# Patient Record
Sex: FEMALE | Race: WHITE | Employment: UNEMPLOYED | URBAN - METROPOLITAN AREA
[De-identification: names, ages, dates, MRNs, and addresses within clinical notes are randomized per-mention and may not be internally consistent; named-entity substitution may affect disease eponyms.]

---

## 2018-08-06 ENCOUNTER — DOCUMENTATION ONLY (OUTPATIENT)
Dept: NEUROLOGY | Age: 53
End: 2018-08-06

## 2018-08-08 ENCOUNTER — DOCUMENTATION ONLY (OUTPATIENT)
Dept: NEUROLOGY | Age: 53
End: 2018-08-08

## 2018-08-13 ENCOUNTER — DOCUMENTATION ONLY (OUTPATIENT)
Dept: NEUROLOGY | Age: 53
End: 2018-08-13

## 2018-08-13 ENCOUNTER — OFFICE VISIT (OUTPATIENT)
Dept: NEUROLOGY | Age: 53
End: 2018-08-13

## 2018-08-13 VITALS
SYSTOLIC BLOOD PRESSURE: 138 MMHG | OXYGEN SATURATION: 98 % | WEIGHT: 145.4 LBS | DIASTOLIC BLOOD PRESSURE: 60 MMHG | BODY MASS INDEX: 24.22 KG/M2 | HEIGHT: 65 IN | HEART RATE: 91 BPM | RESPIRATION RATE: 16 BRPM

## 2018-08-13 DIAGNOSIS — Z72.820 POOR SLEEP: ICD-10-CM

## 2018-08-13 DIAGNOSIS — R41.3 MEMORY LOSS: Primary | ICD-10-CM

## 2018-08-13 DIAGNOSIS — Z82.0 FAMILY HISTORY OF SLEEP APNEA: ICD-10-CM

## 2018-08-13 DIAGNOSIS — Z81.8 FAMILY HISTORY OF DEMENTIA: ICD-10-CM

## 2018-08-13 RX ORDER — ASPIRIN 81 MG/1
TABLET ORAL DAILY
COMMUNITY

## 2018-08-13 RX ORDER — LANOLIN ALCOHOL/MO/W.PET/CERES
500 CREAM (GRAM) TOPICAL DAILY
COMMUNITY

## 2018-08-13 RX ORDER — ATORVASTATIN CALCIUM 10 MG/1
TABLET, FILM COATED ORAL DAILY
COMMUNITY

## 2018-08-13 NOTE — PROGRESS NOTES
Neurology Consult Note      HISTORY PROVIDED BY: patient    Chief Complaint:   Chief Complaint   Patient presents with    Memory Loss      Subjective:    Radha Bland is a 47 y.o. right handed female who presents in consultation for memory loss. Pt reports short term memory loss for the last 3 years. Gives example of being unable to recall names, may lose basket in store, care in parking lot. She is taking notes for phone conversations while working. Her family has noticed changes as well. Started on atorvastatin and ASA 81mg daily for a couple of years due to family h/o vascular disease and MRI findings from 2016. She had a low B12, 245, and just started Vit B12 replacement, 500mcg daily. Doesn't sleep well, wakes up to 10 times a night, may take an hour or two to fall back to sleep one of those times. No environmental cause for waking, no snoring. No trouble going to sleep. Family h/o JORDI in her father. MRI brain wo contrast 16 reviewed in PACS with patient-minimal T2/flair hyperintensities in subcortical white matter, nonspecific, could be consistent with chronic ischemic white matter changes. Labs 18 hemoglobin A1c 5.7, B12 245, CMP-glucose 105, TSH 1.81, CBC-unremarkable, UA-unremarkable, LDL 76-was 105 on 17, vitamin D level 17 was 49.7.     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      Social History Main Topics    Smoking status: Never Smoker    Smokeless tobacco: Never Used    Alcohol use 8.4 oz/week     14 Glasses of wine per week      Comment: 2-3 wine a day    Drug use: No    Sexual activity: Not on file     Other Topics Concern    Not on file     Social History Narrative    , lives in [place]     Family History   Problem Relation Age of Onset    Headache Mother      Dec 69AM from complications of MMP    Heart Disease Mother     Diabetes Mother     Diabetes Father     Dementia Maternal Grandmother     Heart Disease Maternal Grandmother     Stroke Maternal Grandfather     Diabetes Sister     Heart Disease Sister      Has PM    No Known Problems Brother     Dementia Paternal Grandmother     No Known Problems Daughter     No Known Problems Son          Objective:   Review of Systems   Constitutional: Negative. HENT: Negative. Respiratory: Negative. Cardiovascular: Negative. Gastrointestinal: Positive for constipation. Genitourinary: Negative. Musculoskeletal: Negative. Skin: Negative. Neurological: Negative. Endo/Heme/Allergies: Negative. Psychiatric/Behavioral: Positive for memory loss. Not on File     Meds:    Current Outpatient Prescriptions:     atorvastatin (LIPITOR) 10 mg tablet, Take  by mouth daily. , Disp: , Rfl:     aspirin delayed-release 81 mg tablet, Take  by mouth daily. , Disp: , Rfl:       Imaging:  MRI Results (most recent):    Results from [place] encounter on 08/05/16   MRI BRAIN WO CONT   Narrative **Final Report**      ICD Codes / Adm. Diagnosis: 780.93  R41.3 / Memory loss  Other amnesia  Examination:  MR BRAIN WO CON  - [id number] - Aug  5 2016 11:57AM  Accession No:  [id number]  Reason:  Memory loss      REPORT:  Study: MR BRAIN WITHOUT CONTRAST    Indication:  Memory loss    Additional clinical history:  Memory loss Other amnesia    Comparison: None available. Contrast:  None administered. Technique: The following sequences were obtained: Axial T1, T2, T2 FLAIR,   gradient echo; sagittal T1;   Coronal T2; diffusion-weighted imaging. Findings:  The ventricles and sulci are normal in appearance. There is no mass effect   or midline shift. There is no intracranial hemorrhage. Scattered T2/T2 FLAIR   hyperintense foci are seen in subcortical white matter, within normal limits   for the patient's age. Diffusion-weighted imaging demonstrates no evidence of diffusion restriction. There are normal appearing vascular flow voids. The orbits are intact. Normal signal is seen in the paranasal sinuses and   mastoid air cells. IMPRESSION:  No acute intracranial abnormality. No apparent volume loss. Minimal small   vessel ischemic disease changes. Signing/Reading Doctor: Adrianne Davis ([id number])    Gino Davis ([id number])  Aug  5 2016 12:15PM                              CT Results (most recent):  No results found for this or any previous visit. Reviewed records in [facility] [facility] [facility] tab today    Lab Review   No results found for this or any previous visit. Exam:  Visit Vitals    /60    Pulse 91    Resp 16    Ht 5' 5\" (1.651 m)    Wt 66 kg (145 lb 6.4 oz)    SpO2 98%    BMI 24.2 kg/m2     General:  Alert, cooperative, no distress. Head:  Normocephalic, without obvious abnormality, atraumatic. Respiratory:  Heart:   Non labored breathing  Regular rate and rhythm, no murmurs   Neck:   2+ carotids, no bruits   Extremities: Warm, no cyanosis or edema. Pulses: 2+ radial pulses. Neurologic:  MS: Alert and oriented x 4, speech intact. Language intact. Attention and fund of knowledge appropriate. Recent and remote memory intact.   Cranial Nerves:  II: visual fields Full to confrontation   II: pupils Equal, round, reactive to light   II: optic disc    III,VII: ptosis none   III,IV,VI: extraocular muscles  EOMI, no nystagmus or diplopia   V: facial light touch sensation  normal   VII: facial muscle function   symmetric   VIII: hearing intact   IX: soft palate elevation  normal   XI: trapezius strength  5/5   XI: sternocleidomastoid strength 5/5   XII: tongue  Midline     Motor: normal bulk and tone, no tremor Strength: 5/5 throughout, no PD  Sensory: intact to LT, PP  Coordination: FTN and HTS intact, MARIAM intact  Gait: normal gait, able to tandem walk  Reflexes: 2+ symmetric, toes mute    Mini Mental State Exam 2018   What is the Year 1   What is the Season 1   What is the Date 1   What is the Day 1   What is the Month 1   Where are we State 1   Where are we Country 1   Where are we [place] [facility] 1   [place] are we Floor 1   Name three objects, then ask the patient to say them 3   Serial sevens Subtract 7 from 100 in increments 5   Ask for the three objects repeated above 2   Name a pencil 1   Name a watch 1   Have the patient repeat this phrase \"No ifs, ands, or buts\" 1   Three stage command: Take the paper in your right hand 1   Fold the paper in half 1   Put the paper on the floor 1   Read and obey the followin [place] 1   Have the patient write a sentence 1   Have the patient copy a figure 1   Mini Mental Score 29          Assessment/Plan   Pt is a 47 y.o. right handed female c/o short term memory loss for the last 3 years, has strong family h/o dementia in MGM and PGM. PCP ordered MRI brain wo contrast in Aug, 2016 with minimal CIWM changes, on atorvastatin and ASA 81mg daily. B12, 245, just started Vit B12 replacement 500mcg daily. Pt reports poor sleep, waking frequently through the night for no obvious reason, has family h/o JORDI in father. Exam with MMSE score 29/30 missing one at delayed recall able to recall with prompting, o/w is non-focal and unremarkable. Patient's memory loss could be age-related, on background of poor sleep, and vit B12 deficiency could also be playing a part, but given strong family history of dementia cannot exclude early or mild dementia. Recommend neuropsychological testing to tease out dementia versus a pseudodementia. Recommend sleep evaluation for possible obstructive sleep apnea.   Follow-up made in clinic for 6 months to give her time to have these test completed and that is a good interval to reassess her memory. She is instructed to call in the interim should she have any drastic change or new  neurological symptoms develop. ICD-10-CM ICD-9-CM    1. Memory loss R41.3 780.93 REFERRAL TO NEUROPSYCHOLOGY      REFERRAL TO SLEEP STUDIES   2. Family history of dementia Z81.8 V17.2 REFERRAL TO NEUROPSYCHOLOGY   3. Poor sleep Z72.820 V69.4 REFERRAL TO NEUROPSYCHOLOGY      REFERRAL TO SLEEP STUDIES   4. Family history of sleep apnea Z82.0 V19.8 REFERRAL TO SLEEP STUDIES       Signed:   Victoriano Hensley MD  8/13/2018

## 2018-08-13 NOTE — MR AVS SNAPSHOT
[facility] [address] [place] [address] 
[phone] Patient: Ilya Gotti MRN: [id number] Visit Information Date & Time Provider Department Dept. Phone Encounter #  
 8/13/2018 11:00 AM Aaliyah Pittman MD [facility] at [address] [id number] Follow-up Instructions Return in about 6 months (around 2/13/2019). Upcoming Health Maintenance Date Due Hepatitis C Screening 1965 DTaP/Tdap/Td series (1 - Tdap) 9/14/1985 PAP AKA CERVICAL CYTOLOGY 9/14/1985 BREAST CANCER SCRN MAMMOGRAM 9/14/2014 FOBT Q 1 YEAR AGE 50-75 9/14/2014 Influenza Age 5 to Adult 8/1/2018 Allergies as of 8/13/2018  Review Complete On: 8/13/2018 By: Aaliyah Pittman MD  
 Not on File Current Immunizations  Never Reviewed No immunizations on file. Not reviewed this visit You Were Diagnosed With   
  
 Codes Comments Memory loss    -  Primary ICD-10-CM: R41.3 ICD-9-CM: 780.93 Family history of dementia     ICD-10-CM: Z81.8 ICD-9-CM: V17.2 Poor sleep     ICD-10-CM: Z72.820 ICD-9-CM: V69.4 Family history of sleep apnea     ICD-10-CM: Z82.0 ICD-9-CM: V19.8 Vitals BP Pulse Resp Height(growth percentile) Weight(growth percentile) SpO2  
 138/60 91 16 5' 5\" (1.651 m) 145 lb 6.4 oz (66 kg) 98% BMI Smoking Status 24.2 kg/m2 Never Smoker Vitals History BMI and BSA Data Body Mass Index Body Surface Area  
 24.2 kg/m 2 1.74 m 2 Your Updated Medication List  
  
   
This list is accurate as of 8/13/18 11:36 AM.  Always use your most recent med list.  
  
  
  
  
 aspirin delayed-release 81 mg tablet Take  by mouth daily. atorvastatin 10 mg tablet Commonly known as:  LIPITOR Take  by mouth daily. VITAMIN B-12 500 mcg tablet Generic drug:  cyanocobalamin Take 500 mcg by mouth daily. We Performed the Following REFERRAL TO NEUROPSYCHOLOGY [INX00 Custom] Comments:  
 Please evaluate patient for dementia vs pseudodementia. Family h/o dementia in MGM and PGM. REFERRAL TO SLEEP STUDIES [REF99 Custom] Comments:  
 Please evaluate patient for JORDI. Pt wakes throughout the night, denies worrying. Has family h/o JORDI in father. Follow-up Instructions Return in about 6 months (around 2/13/2019). Referral Information Referral ID Referred By Referred To  
  
 [id number] Danilo Barros [address] [facility], [address] [facility] Phone: [phone] Fax: [phone] Visits Status Start Date End Date 1 New Request 8/13/18 8/13/19 If your referral has a status of pending review or denied, additional information will be sent to support the outcome of this decision. Referral ID Referred By Referred To  
 [id number] Melquiades Alvarez MD  
   [address] [place] [address] [address] [place] [address], [address] [place] [address] Phone: [phone] Fax: [phone] Visits Status Start Date End Date 1 New Request 8/13/18 8/13/19 If your referral has a status of pending review or denied, additional information will be sent to support the outcome of this decision. Patient Instructions A Healthy Lifestyle: Care Instructions Your Care Instructions A healthy lifestyle can help you feel good, stay at a healthy weight, and have plenty of energy for both work and play. A healthy lifestyle is something you can share with your whole family. A healthy lifestyle also can lower your risk for serious health problems, such as high blood pressure, heart disease, and diabetes. You can follow a few steps listed below to improve your health and the health of your family. Follow-up care is a key part of your treatment and safety.  Be sure to make and go to all appointments, and call your doctor if you are having problems. It's also a good idea to know your test results and keep a list of the medicines you take. How can you care for yourself at home? · Do not eat too much sugar, fat, or fast foods. You can still have dessert and treats now and then. The goal is moderation. · Start small to improve your eating habits. Pay attention to portion sizes, drink less juice and soda pop, and eat more fruits and vegetables. ¨ Eat a healthy amount of food. A 3-ounce serving of meat, for example, is about the size of a deck of cards. Fill the rest of your plate with vegetables and whole grains. ¨ Limit the amount of soda and sports drinks you have every day. Drink more water when you are thirsty. ¨ Eat at least 5 servings of fruits and vegetables every day. It may seem like a lot, but it is not hard to reach this goal. A serving or helping is 1 piece of fruit, 1 cup of vegetables, or 2 cups of leafy, raw vegetables. Have an apple or some carrot sticks as an afternoon snack instead of a candy bar. Try to have fruits and/or vegetables at every meal. 
· Make exercise part of your daily routine. You may want to start with simple activities, such as walking, bicycling, or slow swimming. Try to be active 30 to 60 minutes every day. You do not need to do all 30 to 60 minutes all at once. For example, you can exercise 3 times a day for 10 or 20 minutes. Moderate exercise is safe for most people, but it is always a good idea to talk to your doctor before starting an exercise program. 
· Keep moving. [employer] the lawn, work in the garden, or [employer]. Take the stairs instead of the elevator at work. · If you smoke, quit. People who smoke have an increased risk for heart attack, stroke, cancer, and other lung illnesses. Quitting is hard, but there are ways to boost your chance of quitting tobacco for good. ¨ Use nicotine gum, patches, or lozenges. ¨ Ask your doctor about stop-smoking programs and medicines. ¨ Keep trying. In addition to reducing your risk of diseases in the future, you will notice some benefits soon after you stop using tobacco. If you have shortness of breath or asthma symptoms, they will likely get better within a few weeks after you quit. · Limit how much alcohol you drink. Moderate amounts of alcohol (up to 2 drinks a day for men, 1 drink a day for women) are okay. But drinking too much can lead to liver problems, high blood pressure, and other health problems. Family health If you have a family, there are many things you can do together to improve your health. · Eat meals together as a family as often as possible. · Eat healthy foods. This includes fruits, vegetables, lean meats and dairy, and whole grains. · Include your family in your fitness plan. Most people think of activities such as jogging or tennis as the way to fitness, but there are many ways you and your family can be more active. Anything that makes you breathe hard and gets your heart pumping is exercise. Here are some tips: 
¨ Walk to do errands or to take your child to school or the bus. ¨ Go for a family bike ride after dinner instead of watching TV. Where can you learn more? Go to http://андрей-sarabjit.info/. Enter D624 in the search box to learn more about \"A Healthy Lifestyle: Care Instructions. \" Current as of: December 7, 2017 Content Version: 11.7 © 7540-3493 [employer]. Care instructions adapted under license by [employer] (which disclaims liability or warranty for this information). If you have questions about a medical condition or this instruction, always ask your healthcare professional. [address] any warranty or liability for your use of this information. Introducing [facility]!    
 Francine Mendes introduces [employer] patient portal. Now you can access parts of your medical record, email your doctor's office, and request medication refills online. 1. In your internet browser, go to [email or web address]. [email or web address] 2. Click on the First Time User? Click Here link in the Sign In box. You will see the New Member Sign Up page. 3. Enter your [employer]wheel Access Code exactly as it appears below. You will not need to use this code after youve completed the sign-up process. If you do not sign up before the expiration date, you must request a new code. · [employer] Access Code: [email or web address] Expires: 11/11/2018 10:08 AM 
 
4. Enter the last four digits of your Social Security Number (xxxx) and Date of Birth (mm/dd/yyyy) as indicated and click Submit. You will be taken to the next sign-up page. 5. Create a Talkwheel ID. This will be your Talkwheel login ID and cannot be changed, so think of one that is secure and easy to remember. 6. Create a Talkwheel password. You can change your password at any time. 7. Enter your Password Reset Question and Answer. This can be used at a later time if you forget your password. 8. Enter your e-mail address. You will receive e-mail notification when new information is available in [address] 9. Click Sign Up. You can now view and download portions of your medical record. 10. Click the Download Summary menu link to download a portable copy of your medical information. If you have questions, please visit the Frequently Asked Questions section of the [employer] website. Remember, [employer] is NOT to be used for urgent needs. For medical emergencies, dial 911. Now available from your iPhone and Android! Please provide this summary of care documentation to your next provider. Your primary care clinician is listed as Illona Even. If you have any questions after today's visit, please call [phone].

## 2018-08-13 NOTE — PATIENT INSTRUCTIONS

## 2018-08-13 NOTE — PROGRESS NOTES
Ms. Weston Raymond is here reporting short term memory loss for the past three years. She reports both grandmothers had dementia. Depression screening done on patient.

## 2018-08-16 ENCOUNTER — TELEPHONE (OUTPATIENT)
Dept: SLEEP MEDICINE | Age: 53
End: 2018-08-16

## 2018-08-16 NOTE — TELEPHONE ENCOUNTER
Spoke with the patient to schedule a sleep consultation per Dr. Geneva Chaney. She stated that she will call back when ready to schedule.

## 2019-01-30 ENCOUNTER — DOCUMENTATION ONLY (OUTPATIENT)
Dept: NEUROLOGY | Age: 54
End: 2019-01-30

## 2019-11-15 ENCOUNTER — HOSPITAL ENCOUNTER (OUTPATIENT)
Dept: GENERAL RADIOLOGY | Age: 54
Discharge: HOME | End: 2019-11-15
Payer: COMMERCIAL

## 2019-11-15 DIAGNOSIS — R07.81 RIB PAIN: ICD-10-CM

## 2019-11-15 PROCEDURE — 71101 X-RAY EXAM UNILAT RIBS/CHEST: CPT
